# Patient Record
Sex: MALE | Race: WHITE
[De-identification: names, ages, dates, MRNs, and addresses within clinical notes are randomized per-mention and may not be internally consistent; named-entity substitution may affect disease eponyms.]

---

## 2018-01-19 ENCOUNTER — HOSPITAL ENCOUNTER (EMERGENCY)
Dept: HOSPITAL 80 - FED | Age: 48
Discharge: HOME | End: 2018-01-19
Payer: COMMERCIAL

## 2018-01-19 VITALS — RESPIRATION RATE: 18 BRPM

## 2018-01-19 VITALS
HEART RATE: 102 BPM | TEMPERATURE: 98.2 F | DIASTOLIC BLOOD PRESSURE: 93 MMHG | OXYGEN SATURATION: 93 % | SYSTOLIC BLOOD PRESSURE: 132 MMHG
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
## 2018-01-19 DIAGNOSIS — S06.0X0A: Primary | ICD-10-CM

## 2018-01-19 DIAGNOSIS — S00.03XA: ICD-10-CM

## 2018-01-19 NOTE — EDPHY
H & P


Stated Complaint: fell off moving chairhit head/no loc is at baseline r/t 

learning disability


HPI/ROS: 


HPI:  This is a 47-year-old male presents with





Chief Complaint:  Head injury





Location:


Quality:


Duration:


Signs and Symptoms:


Timing:


Severity:


Context:  Patient was getting aspirin out of a cabinet and went to sit back 

down in a chair when his friend moved away and he fell backwards from a near 

sitting position hitting his head directly on the carpeted floor.  He did not 

lose LOC.  He was able to get up on his own.  Denies dizziness/nausea/vomiting/

neck pain. She does have a mild headache as well as a "goose egg" at the base 

of the skull.  Does not take any blood thinners. 


Modifying Factors: 





Comment: 








ROS: see HPI


Constitutional: No fever, no chills, no weight loss


Eyes:  No blurred vision


Respiratory:  No shortness of breath, no cough


Cardiovascular:  No chest pain


Gastrointestinal:  No nausea, no vomiting, no diarrhea 


Genitourinary:  No dysuria 


Extremities:  No myalgias 


Neurologic:  No weakness, no numbness


Skin:  No rashes


Hematologic:  No bruising, no bleeding





MEDICAL/SURGICAL/SOCIAL HISTORY: 


Medical history:  Learning disability Generally healthy.  Does not take any 

regular medications.


Surgical history:  Denies


Social history:  unemployed.  











CONSTITUTIONAL:  Overweight adult white male, pleasant, talkative, awake and 

alert, no obvious distress


HEENT:  6 mm contusion noted at the 6 parietal lobe of scalp; and normocephalic.


NECK: supple, no midline tenderness, flexion 45 degrees, extension 45 degrees, 

right and left lateral flexion 45 degrees. No meningismus.


Cardiovascular: Normal S1/S2, regular rate, regular rhythm, without murmur rub 

or gallop.


PULMONARY/CHEST:  Symmetrical and nontender. no crepitus. Clear to auscultation 

bilaterally. Good air movement. No accessory muscle usage.


ABDOMEN:  Soft, nondistended, nontender, no ecchymosis.


PELVIC: no pain with rocking; bilateral hips flexion 125 degrees, extension 30 

degrees, with no pain internal rotation and no pain external rotation.


BACK:  No midline tenderness, no paraspinous spasm, deep tendon reflexes 2/2, 

no pain with straight leg raise


EXTREMITIES:  2/2 pulses, no deformities, no clubbing, no cyanosis or edema.


NEUROLOGICAL: no focal neuro deficits.  GCS 15.  Light touch sensation intact.


SKIN: Warm and dry, no erythema. no rash.  Good capillary refill. 








Source: Patient


Exam Limitations: No limitations





- Personal History


Current Tetanus/Diphtheria Vaccine: Unsure





- Medical/Surgical History


Hx Asthma: No


Hx Chronic Respiratory Disease: No


Hx Diabetes: No


Hx Cardiac Disease: No


Hx Renal Disease: No


Hx Cirrhosis: No


Hx Alcoholism: No


Hx HIV/AIDS: No


Hx Splenectomy or Spleen Trauma: No


Other PMH: learning disability





- Social History


Smoking Status: Never smoked


Constitutional: 





 Initial Vital Signs











Temperature (C)  36.9 C   01/19/18 16:15


 


Heart Rate  122 H  01/19/18 16:15


 


Respiratory Rate  18   01/19/18 16:15


 


Blood Pressure  122/98 H  01/19/18 16:15


 


O2 Sat (%)  92   01/19/18 16:15








 











O2 Delivery Mode               Room Air














Allergies/Adverse Reactions: 


 





acetaminophen [From Vicodin] Allergy (Verified 01/19/18 16:15)


 


hydrocodone [From Vicodin] Allergy (Verified 01/19/18 16:15)


 








Home Medications: 














 Medication  Instructions  Recorded


 


Ondansetron Odt [Zofran Odt 4 mg 4 mg PO Q4 PRN #12 tab 01/19/18





(*)]  














Medical Decision Making


ED Course/Re-evaluation: 


Based on [facility] head CT scan/cervical scan Rule; imaging is not recommended 

at this time. 


Patient has politely declined any head CT or cervical CT imaging at this time.


No neurological deficits.  No LOC.  No vomiting. 


Advised supportive care, concussion precautions, and follow up primary care 

provider within 5-7 days. 








This patient was seen under the supervision of my secondary supervising 

physician.  I evaluated care for this patient independently.  Discussed this 

patient with Dr. Zabala who did not see the patient.  Patient's presentation, 

labs/imaging, treatment and plan of care were discussed with secondary 

supervising physician. 





Differential Diagnosis: 


Head injury including but not limited to concussion, skull fracture, 

intraparenchymal contusion, subarachnoid, subdural and epidural hematoma.








Departure





- Departure


Disposition: Home, Routine, Self-Care


Clinical Impression: 


 Contusion of scalp, initial encounter





Mild concussion


Qualifiers:


 Encounter type: initial encounter Loss of consciousness presence/duration: 

without LOC Qualified Code(s): S06.0X0A - Concussion without loss of 

consciousness, initial encounter





Condition: Good


Instructions:  Scalp Contusion in Adults (ED), Concussion (ED), Post Concussion 

Syndrome (ED), Chronic Post Traumatic Headache (ED)


Additional Instructions: 


Take Tylenol 650 mg every 4 hours and/or Ibuprofen 600 mg every 8 hours with 

food as needed for pain. 


Apply ice for 30 minutes at a time; 2-3 times per day for the next 1-2 days. 


Monitor for signs and symptoms of concussion.  Avoid strenuous activities until 

feeling better. 


Follow-up with her primary care provider in the next 5-7 days for post 

concussion re-evaluation





Referrals: 


KRISTEN LU [Primary Care Provider] - 5-7 days, call for appt.


Prescriptions: 


Ondansetron Odt [Zofran Odt 4 mg (*)] 4 mg PO Q4 PRN #12 tab


 PRN Reason: Nausea/Vomiting, Use 1st